# Patient Record
Sex: MALE | Race: WHITE | NOT HISPANIC OR LATINO
[De-identification: names, ages, dates, MRNs, and addresses within clinical notes are randomized per-mention and may not be internally consistent; named-entity substitution may affect disease eponyms.]

---

## 2023-04-05 ENCOUNTER — APPOINTMENT (OUTPATIENT)
Dept: DERMATOLOGY | Facility: CLINIC | Age: 68
End: 2023-04-05
Payer: MEDICARE

## 2023-04-05 VITALS — HEIGHT: 70 IN | WEIGHT: 260 LBS | BODY MASS INDEX: 37.22 KG/M2

## 2023-04-05 PROBLEM — Z00.00 ENCOUNTER FOR PREVENTIVE HEALTH EXAMINATION: Status: ACTIVE | Noted: 2023-04-05

## 2023-04-05 PROCEDURE — 17110 DESTRUCTION B9 LES UP TO 14: CPT

## 2023-04-05 NOTE — HISTORY OF PRESENT ILLNESS
[FreeTextEntry1] : Growths on face and back [de-identified] : # Concerning skin lesion \par Location: on face and back \par Duration: years\par Symptoms: raised and itchy \par Personal history of skin cancer: none \par Family history of skin cancer: none\par \par Last seen 9/2020 by Dr. Diamond\par \par

## 2023-04-05 NOTE — PHYSICAL EXAM
[Alert] : alert [Oriented x 3] : ~L oriented x 3 [FreeTextEntry3] : Focused exam performed. Findings notable for:\par \par Verrucous brown stuck on papules on L temple, L cheek, L lower back, and R forearm

## 2023-04-05 NOTE — ASSESSMENT
[FreeTextEntry1] : # Inflamed seborrheic keratosis\par - Benign, reassured\par - Will treat today with LN2 given symptoms (itch)\par \par The risks/benefits/alternatives of cryo-destruction was explained to the patient which, include but are not limited to redness, swelling, pain, blistering, scar, discoloration of skin, and recurrence. The patient expressed understanding of these risks and agreed to the procedure. # 6 lesions treated with 2 cycle of LN2. The procedure was well tolerated, without complication. We have discussed related skin care.\par \par FU 6 weeks for CBE and spot check

## 2023-05-17 ENCOUNTER — APPOINTMENT (OUTPATIENT)
Dept: DERMATOLOGY | Facility: CLINIC | Age: 68
End: 2023-05-17

## 2023-08-08 ENCOUNTER — APPOINTMENT (OUTPATIENT)
Dept: DERMATOLOGY | Facility: CLINIC | Age: 68
End: 2023-08-08
Payer: MEDICARE

## 2023-08-08 VITALS — BODY MASS INDEX: 37.09 KG/M2 | HEIGHT: 70.5 IN | WEIGHT: 262 LBS

## 2023-08-08 PROCEDURE — 99214 OFFICE O/P EST MOD 30 MIN: CPT | Mod: 25

## 2023-08-08 PROCEDURE — 17110 DESTRUCTION B9 LES UP TO 14: CPT

## 2023-08-08 RX ORDER — INSULIN DEGLUDEC INJECTION 100 U/ML
100 INJECTION, SOLUTION SUBCUTANEOUS
Refills: 0 | Status: ACTIVE | COMMUNITY

## 2023-08-08 RX ORDER — INSULIN LISPRO 100 [IU]/ML
INJECTION, SOLUTION INTRAVENOUS; SUBCUTANEOUS
Refills: 0 | Status: ACTIVE | COMMUNITY

## 2023-08-08 RX ORDER — LISINOPRIL 30 MG/1
TABLET ORAL
Refills: 0 | Status: ACTIVE | COMMUNITY

## 2023-08-08 RX ORDER — FLUCONAZOLE 200 MG/1
200 TABLET ORAL
Qty: 4 | Refills: 0 | Status: ACTIVE | COMMUNITY
Start: 2023-08-08 | End: 1900-01-01

## 2023-08-08 NOTE — HISTORY OF PRESENT ILLNESS
[FreeTextEntry1] : rash [de-identified] : # Rash Onset: years  Location:l egs, buttocks, abdomen  Symptoms (pain, itch, bleeding, etc): itch, spreading.  Aggravating factors:  Previous treatments and response: none   reports most ISKs resolved s/p LN2 by Dr. Powell previously. Has one residual on left chin.

## 2023-08-08 NOTE — ASSESSMENT
[FreeTextEntry1] : 1) Tinea cruris, severe exacerbation of chronic disease  - Reviewed risks (as well as mitigation strategies for adverse drug events as applicable), benefits, and alternatives of therapy - Start fluconazole 200 mg once weekly X 4 weeks  2) ISK, left chin - Treated 1 lesion w/ LN2 X 2 cycles The risks/benefits/alternatives of cryo-destruction was explained to the patient which include but are not limited to redness, pain, blistering, scar, discoloration of skin, and recurrence. The patient expressed understanding of these risks and agreed to the procedure. The procedure was well tolerated, without complication. We have discussed related skin care.   RTC 4-6 weeks or prn

## 2023-08-08 NOTE — PHYSICAL EXAM
[FreeTextEntry3] : 5 brown papules on the left chin annular scaly plaques on the groin, abdominal fold, and buttocks

## 2023-09-05 ENCOUNTER — APPOINTMENT (OUTPATIENT)
Dept: DERMATOLOGY | Facility: CLINIC | Age: 68
End: 2023-09-05
Payer: MEDICARE

## 2023-09-05 DIAGNOSIS — B35.6 TINEA CRURIS: ICD-10-CM

## 2023-09-05 PROCEDURE — 17111 DESTRUCTION B9 LESIONS 15/>: CPT

## 2023-09-05 PROCEDURE — 99212 OFFICE O/P EST SF 10 MIN: CPT | Mod: 25

## 2023-09-05 NOTE — HISTORY OF PRESENT ILLNESS
[FreeTextEntry1] : rash-RPA  [de-identified] : tinea cruris improved. pt notes occasional redness in the area. s/p fluconazole X 4 weeks. interested in treating ISKs today  reports most ISKs resolved s/p LN2 by Dr. Powell previously. Has one residual on left chin.

## 2023-09-05 NOTE — ASSESSMENT
[FreeTextEntry1] : 1) Tinea cruris - resolved s/p fluconazole 200 mg once weekly X 4 weeks - Pt notes occasional redness in the area. unclear if intertrigo or other. advised to RTC if this becomes persistent   2) ISKs, left cheek, abdomen - Treated 18 lesions w/ LN2 X 2 cycles The risks/benefits/alternatives of cryo-destruction was explained to the patient which include but are not limited to redness, pain, blistering, scar, discoloration of skin, and recurrence. The patient expressed understanding of these risks and agreed to the procedure. The procedure was well tolerated, without complication. We have discussed related skin care.   RTC 1 month

## 2023-09-05 NOTE — PHYSICAL EXAM
[FreeTextEntry3] : Scattered well demarcated stuck on appearing brown plaques on the trunk and extremities, and face no rash in groin

## 2024-05-10 ENCOUNTER — APPOINTMENT (OUTPATIENT)
Dept: DERMATOLOGY | Facility: CLINIC | Age: 69
End: 2024-05-10
Payer: MEDICARE

## 2024-05-10 ENCOUNTER — LABORATORY RESULT (OUTPATIENT)
Age: 69
End: 2024-05-10

## 2024-05-10 DIAGNOSIS — L71.9 ROSACEA, UNSPECIFIED: ICD-10-CM

## 2024-05-10 DIAGNOSIS — D48.5 NEOPLASM OF UNCERTAIN BEHAVIOR OF SKIN: ICD-10-CM

## 2024-05-10 DIAGNOSIS — L30.4 ERYTHEMA INTERTRIGO: ICD-10-CM

## 2024-05-10 PROCEDURE — 99214 OFFICE O/P EST MOD 30 MIN: CPT | Mod: 25

## 2024-05-10 PROCEDURE — 11102 TANGNTL BX SKIN SINGLE LES: CPT

## 2024-05-10 RX ORDER — METRONIDAZOLE 7.5 MG/G
0.75 CREAM TOPICAL
Qty: 45 | Refills: 11 | Status: ACTIVE | COMMUNITY
Start: 2024-05-10 | End: 1900-01-01

## 2024-05-10 RX ORDER — KETOCONAZOLE 20 MG/G
2 CREAM TOPICAL
Qty: 1 | Refills: 2 | Status: ACTIVE | COMMUNITY
Start: 2024-05-10 | End: 1900-01-01

## 2024-05-10 RX ORDER — HYDROCORTISONE 25 MG/G
2.5 CREAM TOPICAL
Qty: 30 | Refills: 2 | Status: ACTIVE | COMMUNITY
Start: 2024-05-10 | End: 1900-01-01

## 2024-05-10 NOTE — ASSESSMENT
[FreeTextEntry1] : 1) Rosacea, face, ETT and papulopustular, moderate exacerbation of chronic disease  - Reviewed risks (as well as mitigation strategies for adverse drug events as applicable), benefits, and alternatives of therapy  - Start metronidazole 0.75% cream bid to AA, SED  - Recommended OTC sunscreen SPF 30 or higher use regularly   2) ISK, scalp - Discussed cryotherapy. Declined treatment today  3) Intertrigo, inguinal folds - No evidence of recurrence of tinea cruris. suspected intertrigo based on hx  - Start hydrocortisone 2.5% cream mixed with ketoconazole BID to AA for up to 2 weeks and then stop. can use ketoconazole cream for maintenance   4) Neoplasm of uncertain behavior of skin, right thigh - r/o irritated acrochordon - shave tangential bx performed The risks/benefits/alternatives of skin biopsy were explained to the patient which include but are not limited to scar, bleeding, infection, and recurrence. The area was prepped with rubbing alcohol, lidocaine was injected for anesthesia and biopsy was performed. The patient tolerated the procedure well.   RTC 2-3 months or prn

## 2024-05-10 NOTE — PHYSICAL EXAM
[FreeTextEntry3] : no rash in the groin pink papules, centrofacial telangiectasias, and rhinophymatous changes  brown plaque in the scalp  pedunculated papule on the right thigh

## 2024-05-10 NOTE — HISTORY OF PRESENT ILLNESS
[FreeTextEntry1] : rash-RPA  [de-identified] : tinea cruris improved. pt notes occasional redness in the area. s/p fluconazole X 4 weeks.  reports most ISKs resolved s/p LN2  Has new bumps in the scalp/face. Also notes a bump on the right thigh. Notes occasional itch in the groin.

## 2024-05-20 ENCOUNTER — NON-APPOINTMENT (OUTPATIENT)
Age: 69
End: 2024-05-20

## 2024-06-14 ENCOUNTER — APPOINTMENT (OUTPATIENT)
Dept: DERMATOLOGY | Facility: CLINIC | Age: 69
End: 2024-06-14
Payer: MEDICARE

## 2024-06-14 DIAGNOSIS — L82.0 INFLAMED SEBORRHEIC KERATOSIS: ICD-10-CM

## 2024-06-14 PROCEDURE — 99213 OFFICE O/P EST LOW 20 MIN: CPT

## 2024-06-14 NOTE — HISTORY OF PRESENT ILLNESS
[FreeTextEntry1] : growth in right ear  [de-identified] : Patient states has growth in right ear. Patient states has had for about 3 to 4 months, but it is now starting to bleed. Also has irritated bumps on his left chest.

## 2024-06-14 NOTE — ASSESSMENT
[FreeTextEntry1] : 1) ISKs, right ear and left chest - Treated 4 lesions w/ LN2 X 2 cycles The risks/benefits/alternatives of cryo-destruction was explained to the patient which include but are not limited to redness, pain, blistering, scar, discoloration of skin, and recurrence. The patient expressed understanding of these risks and agreed to the procedure. The procedure was well tolerated, without complication. We have discussed related skin care.   RTC 1 month or prn

## 2024-06-14 NOTE — PHYSICAL EXAM
[FreeTextEntry3] : Scattered well demarcated stuck on appearing brown plaques on the left chest and right ear. erythema and hemorrhagic crusting over the lesion on the right ear

## 2024-09-26 ENCOUNTER — OFFICE (OUTPATIENT)
Dept: URBAN - METROPOLITAN AREA CLINIC 29 | Facility: CLINIC | Age: 69
Setting detail: OPHTHALMOLOGY
End: 2024-09-26
Payer: MEDICARE

## 2024-09-26 DIAGNOSIS — E11.9: ICD-10-CM

## 2024-09-26 DIAGNOSIS — H25.13: ICD-10-CM

## 2024-09-26 DIAGNOSIS — H40.053: ICD-10-CM

## 2024-09-26 PROCEDURE — 92004 COMPRE OPH EXAM NEW PT 1/>: CPT | Performed by: OPHTHALMOLOGY

## 2024-09-26 ASSESSMENT — CONFRONTATIONAL VISUAL FIELD TEST (CVF)
OD_FINDINGS: FULL
OS_FINDINGS: FULL

## 2025-01-17 ENCOUNTER — APPOINTMENT (OUTPATIENT)
Dept: PULMONOLOGY | Facility: CLINIC | Age: 70
End: 2025-01-17
Payer: MEDICARE

## 2025-01-17 VITALS
SYSTOLIC BLOOD PRESSURE: 110 MMHG | DIASTOLIC BLOOD PRESSURE: 70 MMHG | WEIGHT: 258 LBS | OXYGEN SATURATION: 96 % | HEIGHT: 70.5 IN | HEART RATE: 98 BPM | BODY MASS INDEX: 36.53 KG/M2

## 2025-01-17 DIAGNOSIS — E66.9 OBESITY, UNSPECIFIED: ICD-10-CM

## 2025-01-17 DIAGNOSIS — E11.9 TYPE 2 DIABETES MELLITUS W/OUT COMPLICATIONS: ICD-10-CM

## 2025-01-17 DIAGNOSIS — G47.33 OBSTRUCTIVE SLEEP APNEA (ADULT) (PEDIATRIC): ICD-10-CM

## 2025-01-17 DIAGNOSIS — I10 ESSENTIAL (PRIMARY) HYPERTENSION: ICD-10-CM

## 2025-01-17 DIAGNOSIS — Z78.9 OTHER SPECIFIED HEALTH STATUS: ICD-10-CM

## 2025-01-17 PROCEDURE — 99203 OFFICE O/P NEW LOW 30 MIN: CPT

## 2025-01-17 RX ORDER — INSULIN ASPART 100 [IU]/ML
INJECTION, SOLUTION INTRAVENOUS; SUBCUTANEOUS
Refills: 0 | Status: ACTIVE | COMMUNITY

## 2025-06-04 ENCOUNTER — RX RENEWAL (OUTPATIENT)
Age: 70
End: 2025-06-04

## 2025-07-30 ENCOUNTER — RX RENEWAL (OUTPATIENT)
Age: 70
End: 2025-07-30

## 2025-08-05 ENCOUNTER — LABORATORY RESULT (OUTPATIENT)
Age: 70
End: 2025-08-05

## 2025-08-05 ENCOUNTER — APPOINTMENT (OUTPATIENT)
Dept: DERMATOLOGY | Facility: CLINIC | Age: 70
End: 2025-08-05
Payer: MEDICARE

## 2025-08-05 VITALS — HEIGHT: 70.5 IN | BODY MASS INDEX: 38.22 KG/M2 | WEIGHT: 270 LBS

## 2025-08-05 DIAGNOSIS — L82.0 INFLAMED SEBORRHEIC KERATOSIS: ICD-10-CM

## 2025-08-05 DIAGNOSIS — R23.8 OTHER SKIN CHANGES: ICD-10-CM

## 2025-08-05 DIAGNOSIS — D48.5 NEOPLASM OF UNCERTAIN BEHAVIOR OF SKIN: ICD-10-CM

## 2025-08-05 PROCEDURE — 99213 OFFICE O/P EST LOW 20 MIN: CPT | Mod: 25

## 2025-08-05 PROCEDURE — 11102 TANGNTL BX SKIN SINGLE LES: CPT | Mod: 59

## 2025-08-05 PROCEDURE — 17110 DESTRUCTION B9 LES UP TO 14: CPT

## 2025-08-05 RX ORDER — INSULIN ASPART 100 [IU]/ML
INJECTION, SOLUTION INTRAVENOUS; SUBCUTANEOUS
Refills: 0 | Status: ACTIVE | COMMUNITY

## 2025-08-12 ENCOUNTER — NON-APPOINTMENT (OUTPATIENT)
Age: 70
End: 2025-08-12